# Patient Record
Sex: FEMALE | URBAN - METROPOLITAN AREA
[De-identification: names, ages, dates, MRNs, and addresses within clinical notes are randomized per-mention and may not be internally consistent; named-entity substitution may affect disease eponyms.]

---
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
## 2024-07-22 PROBLEM — S27.322A CONTUSION OF BOTH LUNGS: Status: ACTIVE | Noted: 2024-07-22

## 2024-07-22 PROBLEM — T56.894A: Status: ACTIVE | Noted: 2024-07-22

## 2024-07-22 PROBLEM — F10.10 ALCOHOL ABUSE: Status: ACTIVE | Noted: 2024-07-22

## 2024-07-22 PROBLEM — T42.6X1A: Status: ACTIVE | Noted: 2024-07-22

## 2024-07-22 PROBLEM — E87.20 METABOLIC ACIDOSIS: Status: ACTIVE | Noted: 2024-07-22

## 2024-07-22 PROBLEM — T14.90XA TRAUMA: Status: ACTIVE | Noted: 2024-07-22

## 2024-07-22 PROBLEM — E87.6 HYPOKALEMIA: Status: ACTIVE | Noted: 2024-07-22

## 2024-07-22 PROBLEM — J96.01 ACUTE RESPIRATORY FAILURE WITH HYPOXIA (HCC): Status: ACTIVE | Noted: 2024-07-22

## 2024-07-23 PROBLEM — J18.9 PNEUMONIA OF RIGHT UPPER LOBE DUE TO INFECTIOUS ORGANISM: Status: ACTIVE | Noted: 2024-07-23

## 2024-07-26 ENCOUNTER — HOSPITAL ENCOUNTER (INPATIENT)
Age: 39
LOS: 4 days | Discharge: HOME | DRG: 885 | End: 2024-07-30
Attending: PSYCHIATRY & NEUROLOGY | Admitting: PSYCHIATRY & NEUROLOGY
Payer: COMMERCIAL

## 2024-07-26 PROBLEM — F31.4 BIPOLAR DISORDER, CURRENT EPISODE DEPRESSED, SEVERE (HCC): Status: ACTIVE | Noted: 2024-07-26

## 2024-07-26 PROBLEM — R45.851 DEPRESSION WITH SUICIDAL IDEATION: Status: ACTIVE | Noted: 2024-07-26

## 2024-07-26 PROBLEM — F32.A DEPRESSION WITH SUICIDAL IDEATION: Status: ACTIVE | Noted: 2024-07-26

## 2024-07-26 PROCEDURE — APPSS60 APP SPLIT SHARED TIME 46-60 MINUTES: Performed by: NURSE PRACTITIONER

## 2024-07-26 PROCEDURE — 6370000000 HC RX 637 (ALT 250 FOR IP)

## 2024-07-26 PROCEDURE — 6370000000 HC RX 637 (ALT 250 FOR IP): Performed by: NURSE PRACTITIONER

## 2024-07-26 PROCEDURE — 99223 1ST HOSP IP/OBS HIGH 75: CPT | Performed by: PSYCHIATRY & NEUROLOGY

## 2024-07-26 PROCEDURE — 6370000000 HC RX 637 (ALT 250 FOR IP): Performed by: PSYCHIATRY & NEUROLOGY

## 2024-07-26 PROCEDURE — 1240000000 HC EMOTIONAL WELLNESS R&B

## 2024-07-26 RX ORDER — AMOXICILLIN AND CLAVULANATE POTASSIUM 875; 125 MG/1; MG/1
1 TABLET, FILM COATED ORAL EVERY 12 HOURS SCHEDULED
Status: DISCONTINUED | OUTPATIENT
Start: 2024-07-26 | End: 2024-07-30 | Stop reason: HOSPADM

## 2024-07-26 RX ORDER — POLYETHYLENE GLYCOL 3350 17 G/17G
17 POWDER, FOR SOLUTION ORAL DAILY PRN
Status: DISCONTINUED | OUTPATIENT
Start: 2024-07-26 | End: 2024-07-30 | Stop reason: HOSPADM

## 2024-07-26 RX ORDER — PANTOPRAZOLE SODIUM 40 MG/1
40 TABLET, DELAYED RELEASE ORAL
Status: DISCONTINUED | OUTPATIENT
Start: 2024-07-27 | End: 2024-07-30 | Stop reason: HOSPADM

## 2024-07-26 RX ORDER — ACETAMINOPHEN 325 MG/1
650 TABLET ORAL EVERY 8 HOURS PRN
Status: DISCONTINUED | OUTPATIENT
Start: 2024-07-26 | End: 2024-07-26 | Stop reason: SDUPTHER

## 2024-07-26 RX ORDER — ONDANSETRON 4 MG/1
4 TABLET, ORALLY DISINTEGRATING ORAL EVERY 8 HOURS PRN
Status: DISCONTINUED | OUTPATIENT
Start: 2024-07-26 | End: 2024-07-30 | Stop reason: HOSPADM

## 2024-07-26 RX ORDER — LITHIUM CARBONATE 300 MG
150 TABLET ORAL EVERY 12 HOURS SCHEDULED
Status: DISCONTINUED | OUTPATIENT
Start: 2024-07-26 | End: 2024-07-26

## 2024-07-26 RX ORDER — POLYETHYLENE GLYCOL 3350 17 G
2 POWDER IN PACKET (EA) ORAL
Status: DISCONTINUED | OUTPATIENT
Start: 2024-07-26 | End: 2024-07-26

## 2024-07-26 RX ORDER — NICOTINE 21 MG/24HR
1 PATCH, TRANSDERMAL 24 HOURS TRANSDERMAL DAILY
Status: DISCONTINUED | OUTPATIENT
Start: 2024-07-26 | End: 2024-07-30 | Stop reason: HOSPADM

## 2024-07-26 RX ORDER — LITHIUM CARBONATE 300 MG
300 TABLET ORAL EVERY 12 HOURS SCHEDULED
Status: DISCONTINUED | OUTPATIENT
Start: 2024-07-26 | End: 2024-07-26

## 2024-07-26 RX ORDER — IPRATROPIUM BROMIDE AND ALBUTEROL SULFATE 2.5; .5 MG/3ML; MG/3ML
1 SOLUTION RESPIRATORY (INHALATION) EVERY 4 HOURS PRN
Status: DISCONTINUED | OUTPATIENT
Start: 2024-07-26 | End: 2024-07-30 | Stop reason: HOSPADM

## 2024-07-26 RX ORDER — ONDANSETRON 2 MG/ML
4 INJECTION INTRAMUSCULAR; INTRAVENOUS EVERY 6 HOURS PRN
Status: DISCONTINUED | OUTPATIENT
Start: 2024-07-26 | End: 2024-07-30 | Stop reason: HOSPADM

## 2024-07-26 RX ORDER — HYDROXYZINE 50 MG/1
50 TABLET, FILM COATED ORAL 3 TIMES DAILY PRN
Status: DISCONTINUED | OUTPATIENT
Start: 2024-07-26 | End: 2024-07-30 | Stop reason: HOSPADM

## 2024-07-26 RX ORDER — TRAZODONE HYDROCHLORIDE 50 MG/1
50 TABLET ORAL NIGHTLY PRN
Status: DISCONTINUED | OUTPATIENT
Start: 2024-07-26 | End: 2024-07-30 | Stop reason: HOSPADM

## 2024-07-26 RX ORDER — RISPERIDONE 0.5 MG/1
0.5 TABLET ORAL 2 TIMES DAILY PRN
Status: DISCONTINUED | OUTPATIENT
Start: 2024-07-26 | End: 2024-07-29

## 2024-07-26 RX ORDER — GAUZE BANDAGE 2" X 2"
100 BANDAGE TOPICAL DAILY
Status: DISCONTINUED | OUTPATIENT
Start: 2024-07-27 | End: 2024-07-30 | Stop reason: HOSPADM

## 2024-07-26 RX ORDER — POLYETHYLENE GLYCOL 3350 17 G/17G
17 POWDER, FOR SOLUTION ORAL DAILY
Status: DISCONTINUED | OUTPATIENT
Start: 2024-07-27 | End: 2024-07-30 | Stop reason: HOSPADM

## 2024-07-26 RX ORDER — FOLIC ACID 1 MG/1
1 TABLET ORAL DAILY
Status: DISCONTINUED | OUTPATIENT
Start: 2024-07-27 | End: 2024-07-30 | Stop reason: HOSPADM

## 2024-07-26 RX ORDER — MAGNESIUM HYDROXIDE/ALUMINUM HYDROXICE/SIMETHICONE 120; 1200; 1200 MG/30ML; MG/30ML; MG/30ML
30 SUSPENSION ORAL EVERY 6 HOURS PRN
Status: DISCONTINUED | OUTPATIENT
Start: 2024-07-26 | End: 2024-07-30 | Stop reason: HOSPADM

## 2024-07-26 RX ORDER — LITHIUM CARBONATE 150 MG/1
150 CAPSULE ORAL EVERY 12 HOURS SCHEDULED
Status: DISCONTINUED | OUTPATIENT
Start: 2024-07-26 | End: 2024-07-30 | Stop reason: HOSPADM

## 2024-07-26 RX ORDER — LAMOTRIGINE 25 MG/1
25 TABLET ORAL DAILY
Status: DISCONTINUED | OUTPATIENT
Start: 2024-07-27 | End: 2024-07-30 | Stop reason: HOSPADM

## 2024-07-26 RX ORDER — LANOLIN ALCOHOL/MO/W.PET/CERES
3 CREAM (GRAM) TOPICAL NIGHTLY
Status: DISCONTINUED | OUTPATIENT
Start: 2024-07-26 | End: 2024-07-30 | Stop reason: HOSPADM

## 2024-07-26 RX ORDER — ACETAMINOPHEN 325 MG/1
650 TABLET ORAL EVERY 4 HOURS PRN
Status: DISCONTINUED | OUTPATIENT
Start: 2024-07-26 | End: 2024-07-30 | Stop reason: HOSPADM

## 2024-07-26 RX ADMIN — AMOXICILLIN AND CLAVULANATE POTASSIUM 1 TABLET: 875; 125 TABLET, FILM COATED ORAL at 21:20

## 2024-07-26 RX ADMIN — ACETAMINOPHEN 650 MG: 325 TABLET ORAL at 18:08

## 2024-07-26 RX ADMIN — HYDROXYZINE HYDROCHLORIDE 50 MG: 50 TABLET, FILM COATED ORAL at 21:20

## 2024-07-26 RX ADMIN — Medication 3 MG: at 21:20

## 2024-07-26 RX ADMIN — LITHIUM CARBONATE 150 MG: 150 CAPSULE, GELATIN COATED ORAL at 21:20

## 2024-07-26 RX ADMIN — BENZOCAINE: 0.1 GEL TOPICAL at 21:25

## 2024-07-26 RX ADMIN — TRAZODONE HYDROCHLORIDE 50 MG: 50 TABLET ORAL at 21:20

## 2024-07-26 RX ADMIN — BENZOCAINE: 0.1 GEL TOPICAL at 18:08

## 2024-07-26 ASSESSMENT — PAIN DESCRIPTION - LOCATION
LOCATION: MOUTH
LOCATION: MOUTH

## 2024-07-26 ASSESSMENT — PATIENT HEALTH QUESTIONNAIRE - PHQ9
1. LITTLE INTEREST OR PLEASURE IN DOING THINGS: NOT AT ALL
SUM OF ALL RESPONSES TO PHQ9 QUESTIONS 1 & 2: 0
SUM OF ALL RESPONSES TO PHQ QUESTIONS 1-9: 0
SUM OF ALL RESPONSES TO PHQ QUESTIONS 1-9: 0
2. FEELING DOWN, DEPRESSED OR HOPELESS: NOT AT ALL
SUM OF ALL RESPONSES TO PHQ QUESTIONS 1-9: 0
SUM OF ALL RESPONSES TO PHQ QUESTIONS 1-9: 0

## 2024-07-26 ASSESSMENT — SLEEP AND FATIGUE QUESTIONNAIRES
DO YOU HAVE DIFFICULTY SLEEPING: YES
AVERAGE NUMBER OF SLEEP HOURS: 3
DO YOU USE A SLEEP AID: NO
SLEEP PATTERN: DISTURBED/INTERRUPTED SLEEP;RESTLESSNESS

## 2024-07-26 ASSESSMENT — PAIN - FUNCTIONAL ASSESSMENT: PAIN_FUNCTIONAL_ASSESSMENT: ACTIVITIES ARE NOT PREVENTED

## 2024-07-26 ASSESSMENT — LIFESTYLE VARIABLES
HOW MANY STANDARD DRINKS CONTAINING ALCOHOL DO YOU HAVE ON A TYPICAL DAY: 5 OR 6
HOW MANY STANDARD DRINKS CONTAINING ALCOHOL DO YOU HAVE ON A TYPICAL DAY: 5 OR 6
HOW OFTEN DO YOU HAVE A DRINK CONTAINING ALCOHOL: 2-3 TIMES A WEEK
HOW OFTEN DO YOU HAVE A DRINK CONTAINING ALCOHOL: 2-3 TIMES A WEEK

## 2024-07-26 ASSESSMENT — PAIN SCALES - GENERAL
PAINLEVEL_OUTOF10: 4
PAINLEVEL_OUTOF10: 5

## 2024-07-26 NOTE — BH NOTE
Discharge order appeared from non psych provider. Clarified with NP, there is no plan for patient to be discharged today. Order discontinued.

## 2024-07-26 NOTE — BH NOTE
On call provider notified of best practice advisory placing patient on suicide precautions. Order will be discontinued as patient does not meet criteria for suicide precautions at this time.  Patient will be continued on Q 15 minute checks.

## 2024-07-26 NOTE — BH NOTE
Patient arrived on unit transported by two staff members via SQUAD and stretcher with signed voluntary in hand. Patient ambulated independently was assessed, vitals obtained, wanded, changed into appropriate clothing and orientated to unit.
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
## 2024-07-27 LAB
ALBUMIN SERPL-MCNC: 3.2 G/DL (ref 3.5–5.2)
ALP SERPL-CCNC: 80 U/L (ref 35–104)
ALT SERPL-CCNC: 7 U/L (ref 5–33)
ANION GAP SERPL CALCULATED.3IONS-SCNC: 11 MMOL/L (ref 9–17)
AST SERPL-CCNC: 11 U/L
BILIRUB SERPL-MCNC: 0.3 MG/DL (ref 0.3–1.2)
BUN SERPL-MCNC: 8 MG/DL (ref 6–20)
CALCIUM SERPL-MCNC: 8.6 MG/DL (ref 8.6–10.4)
CHLORIDE SERPL-SCNC: 105 MMOL/L (ref 98–107)
CHOLEST SERPL-MCNC: 150 MG/DL
CHOLESTEROL/HDL RATIO: 3.9
CO2 SERPL-SCNC: 26 MMOL/L (ref 20–31)
CREAT SERPL-MCNC: 0.6 MG/DL (ref 0.5–0.9)
EST. AVERAGE GLUCOSE BLD GHB EST-MCNC: 100 MG/DL
GFR, ESTIMATED: >90 ML/MIN/1.73M2
GLUCOSE SERPL-MCNC: 87 MG/DL (ref 70–99)
HBA1C MFR BLD: 5.1 % (ref 4–6)
HDLC SERPL-MCNC: 38 MG/DL
LDLC SERPL CALC-MCNC: 90 MG/DL (ref 0–130)
LITHIUM DATE LAST DOSE: ABNORMAL
LITHIUM DOSE AMOUNT: 150
LITHIUM DOSE TIME: 2120
LITHIUM LEVEL: 0.2 MMOL/L (ref 0.6–1.2)
MAGNESIUM SERPL-MCNC: 2 MG/DL (ref 1.6–2.6)
POTASSIUM SERPL-SCNC: 3.3 MMOL/L (ref 3.7–5.3)
PROT SERPL-MCNC: 6.2 G/DL (ref 6.4–8.3)
SODIUM SERPL-SCNC: 142 MMOL/L (ref 135–144)
TRIGL SERPL-MCNC: 108 MG/DL

## 2024-07-27 PROCEDURE — 1240000000 HC EMOTIONAL WELLNESS R&B

## 2024-07-27 PROCEDURE — 83735 ASSAY OF MAGNESIUM: CPT

## 2024-07-27 PROCEDURE — 99222 1ST HOSP IP/OBS MODERATE 55: CPT | Performed by: INTERNAL MEDICINE

## 2024-07-27 PROCEDURE — 6370000000 HC RX 637 (ALT 250 FOR IP)

## 2024-07-27 PROCEDURE — 80061 LIPID PANEL: CPT

## 2024-07-27 PROCEDURE — 36415 COLL VENOUS BLD VENIPUNCTURE: CPT

## 2024-07-27 PROCEDURE — 6370000000 HC RX 637 (ALT 250 FOR IP): Performed by: NURSE PRACTITIONER

## 2024-07-27 PROCEDURE — 80053 COMPREHEN METABOLIC PANEL: CPT

## 2024-07-27 PROCEDURE — 99232 SBSQ HOSP IP/OBS MODERATE 35: CPT | Performed by: NURSE PRACTITIONER

## 2024-07-27 PROCEDURE — 6370000000 HC RX 637 (ALT 250 FOR IP): Performed by: PSYCHIATRY & NEUROLOGY

## 2024-07-27 PROCEDURE — 83036 HEMOGLOBIN GLYCOSYLATED A1C: CPT

## 2024-07-27 PROCEDURE — 6370000000 HC RX 637 (ALT 250 FOR IP): Performed by: INTERNAL MEDICINE

## 2024-07-27 PROCEDURE — 80178 ASSAY OF LITHIUM: CPT

## 2024-07-27 RX ORDER — LISINOPRIL 5 MG/1
2.5 TABLET ORAL DAILY
Status: DISCONTINUED | OUTPATIENT
Start: 2024-07-27 | End: 2024-07-29

## 2024-07-27 RX ORDER — POTASSIUM CHLORIDE 20 MEQ/1
10 TABLET, EXTENDED RELEASE ORAL 2 TIMES DAILY WITH MEALS
Status: COMPLETED | OUTPATIENT
Start: 2024-07-27 | End: 2024-07-29

## 2024-07-27 RX ADMIN — HYDROXYZINE HYDROCHLORIDE 50 MG: 50 TABLET, FILM COATED ORAL at 08:50

## 2024-07-27 RX ADMIN — Medication 3 MG: at 22:27

## 2024-07-27 RX ADMIN — LISINOPRIL 2.5 MG: 5 TABLET ORAL at 18:15

## 2024-07-27 RX ADMIN — ACETAMINOPHEN 650 MG: 325 TABLET ORAL at 14:28

## 2024-07-27 RX ADMIN — AMOXICILLIN AND CLAVULANATE POTASSIUM 1 TABLET: 875; 125 TABLET, FILM COATED ORAL at 22:27

## 2024-07-27 RX ADMIN — AMOXICILLIN AND CLAVULANATE POTASSIUM 1 TABLET: 875; 125 TABLET, FILM COATED ORAL at 08:50

## 2024-07-27 RX ADMIN — THIAMINE HCL TAB 100 MG 100 MG: 100 TAB at 08:50

## 2024-07-27 RX ADMIN — ACETAMINOPHEN 650 MG: 325 TABLET ORAL at 07:26

## 2024-07-27 RX ADMIN — POTASSIUM CHLORIDE 10 MEQ: 1500 TABLET, EXTENDED RELEASE ORAL at 18:15

## 2024-07-27 RX ADMIN — HYDROXYZINE HYDROCHLORIDE 50 MG: 50 TABLET, FILM COATED ORAL at 22:27

## 2024-07-27 RX ADMIN — LITHIUM CARBONATE 150 MG: 150 CAPSULE, GELATIN COATED ORAL at 22:27

## 2024-07-27 RX ADMIN — TRAZODONE HYDROCHLORIDE 50 MG: 50 TABLET ORAL at 22:27

## 2024-07-27 RX ADMIN — PANTOPRAZOLE SODIUM 40 MG: 40 TABLET, DELAYED RELEASE ORAL at 06:27

## 2024-07-27 RX ADMIN — LAMOTRIGINE 25 MG: 25 TABLET ORAL at 08:50

## 2024-07-27 RX ADMIN — BENZOCAINE: 0.1 GEL TOPICAL at 22:27

## 2024-07-27 RX ADMIN — HYDROXYZINE HYDROCHLORIDE 50 MG: 50 TABLET, FILM COATED ORAL at 16:24

## 2024-07-27 RX ADMIN — LITHIUM CARBONATE 150 MG: 150 CAPSULE, GELATIN COATED ORAL at 08:50

## 2024-07-27 RX ADMIN — FOLIC ACID 1 MG: 1 TABLET ORAL at 08:50

## 2024-07-27 ASSESSMENT — PAIN SCALES - GENERAL
PAINLEVEL_OUTOF10: 5
PAINLEVEL_OUTOF10: 6
PAINLEVEL_OUTOF10: 6

## 2024-07-27 ASSESSMENT — LIFESTYLE VARIABLES
HOW OFTEN DO YOU HAVE A DRINK CONTAINING ALCOHOL: 2-3 TIMES A WEEK
HOW MANY STANDARD DRINKS CONTAINING ALCOHOL DO YOU HAVE ON A TYPICAL DAY: 5 OR 6
HOW OFTEN DO YOU HAVE A DRINK CONTAINING ALCOHOL: 2-3 TIMES A WEEK
HOW MANY STANDARD DRINKS CONTAINING ALCOHOL DO YOU HAVE ON A TYPICAL DAY: 5 OR 6

## 2024-07-27 ASSESSMENT — PAIN DESCRIPTION - LOCATION
LOCATION: MOUTH
LOCATION: GENERALIZED
LOCATION: GENERALIZED

## 2024-07-27 ASSESSMENT — PAIN DESCRIPTION - DESCRIPTORS: DESCRIPTORS: DISCOMFORT

## 2024-07-27 NOTE — GROUP NOTE
Psych-Ed/Relapse Prevention Group Note        Date: July 27, 2024 Start Time: 2:30pm  End Time:  3:15pm      Number of Participants in Group & Unit Census:  6/16    Topic: Socialization skills    Goal of Group:Patient will demonstrate improved interpersonal skills and offer peer support      Comments:     Patient did not participate in Psych-Ed/Relapse Prevention group, despite staff encouragement and explanation of benefits.  Patient remain seclusive to self.  Q15 minute safety checks maintained for patient safety and will continue to encourage patient to attend unit programming.         Signature:  FRANKIE PerezS

## 2024-07-27 NOTE — GROUP NOTE
Group Therapy Note    Date: 7/27/2024    Group Start Time: 1030  Group End Time: 1125  Group Topic: Psychoeducation    [facility] Elena Pritchard LSW        Group Therapy Note    Attendees: 6/15       Patient's Goal:  managing symptoms of anxiety     Notes:      Status After Intervention:  Improved    Participation Level: Active Listener and Interactive    Participation Quality: Appropriate      Speech:  normal      Thought Process/Content: Logical      Affective Functioning: Flat      Mood: anxious      Level of consciousness:  Alert and Oriented x4      Response to Learning: Progressing to goal      Endings: None Reported    Modes of Intervention: Education and Support      Discipline Responsible: /Counselor      Signature:  AUDRA Quinonez

## 2024-07-27 NOTE — H&P
[facility] Internal Medicine  Jatin See MD; Abdoul Melendez MD, Chucho Post MD, April Perez MD, Brayan Mackenzie MD; Bing Cox MD    [facility] Internal Medicine   IN-PATIENT SERVICE   [facility]     HISTORY AND PHYSICAL EXAMINATION            Date:   7/27/2024  Patient name:  Audrey Bryant  Date of admission:  7/26/2024  1:01 PM  MRN:   [id number]  Account:  [id number]  YOB: 1985  PCP:    Kirill Lopes DO  Room:   [address] [place] [address]  Code Status:    Full Code      Chief Complaint:     Suicidal /Ac Psychosis    History Obtained From:     Patient/EMR/bedside RN     History of Present Illness:   38 female with underlying history of anxiety, depression, polysubstance abuse, obesity, hypertension, admitted to inpatient psych for suicidal ideation      Past Medical History:     No past medical history on file.     Past Surgical History:     No past surgical history on file.     Medications Prior to Admission:     Prior to Admission medications    Medication Sig Start Date End Date Taking? Authorizing Provider   lamoTRIgine (LAMICTAL) 25 MG tablet Take 1 tablet by mouth daily 7/26/24   Davina Cope MD   amoxicillin-clavulanate (AUGMENTIN) 875-125 MG per tablet Take 1 tablet by mouth every 12 hours for 10 doses 7/25/24 7/30/24  Davina Cope MD   lithium citrate 8 MEQ/5ML SOLN oral solution Take 1.7 mLs by mouth in the morning and 1.7 mLs in the evening. 7/25/24   MoDavina nugent MD        Allergies:     Patient has no known allergies.    Social History:     Tobacco:    reports that she has been smoking cigarettes. She does not have any smokeless tobacco history on file.  Alcohol:      has no history on file for alcohol use.  Drug Use:  has no history on file for drug use.    Family History:     No family history on file.    Review of Systems:     Positive and Negative as described in HPI.    CONSTITUTIONAL:  negative for fevers, 
violence or aggression: [] Yes [x] No         PSYCHIATRIC HISTORY:  [x] Yes [] No    Currently follows with [facility].  Two lifetime suicide attempts  Reports four previous psychiatric hospital admissions at [facility].    Home Medication Compliance: [] Yes [x] No    Past psychiatric medications includes: Lamictal, lithium, Wellbutrin, propranolol, trazodone    Adverse reactions from psychotropic medications: [] Yes [x] No         Lifetime Psychiatric Review of Systems         Depression: Endorses     Anxiety: Endorses     Panic Attacks: Endorses     Cynthia or Hypomania: Endorses     Phobias: Denies     Obsessions and Compulsions: Denies     Body or Vocal Tics: Denies     Visual Hallucinations: Denies     Auditory Hallucinations: Denies     Delusions/Paranoia: Denies     PTSD: Denies    Past Medical History:    No past medical history on file.    Past Surgical History:    No past surgical history on file.    Allergies:  Patient has no known allergies.         Social History:     Born in: [place], [place]  Family: Raised by mother who is now , reports father in and out of life.  Has 3 siblings.  Highest Level of Education: graduated HS.  Occupation: manager at [employer]  Marital Status:   Children: 2, ages 11 and 13  Residence: Apartment with children  Stressors: mental illness, finances  Patient Assets/Supportive Factors: Patient seeks assistance.         DRUG USE HISTORY  Social History     Tobacco Use   Smoking Status Every Day    Current packs/day: 1.00    Types: Cigarettes   Smokeless Tobacco Not on file     Social History     Substance and Sexual Activity   Alcohol Use Not on file     Social History     Substance and Sexual Activity   Drug Use Not on file       Admits to smoking marijuana, she does not provide details of frequency.  Admits to drinking a 10 pack of \"fireball\" 3 to 4 days a week.         LEGAL HISTORY:   HISTORY OF INCARCERATION: [] Yes [x] No    Family History:   No family history on

## 2024-07-27 NOTE — GROUP NOTE
Group Therapy Note    Date: 7/27/2024    Group Start Time: 0900  Group End Time: 0920  Group Topic: Community Meeting    Lashell Wharton        Group Therapy Note    Attendees: 6       Patient's Goal:  to get composed    Status After Intervention:  Improved    Participation Level: Active Listener and Interactive    Participation Quality: Appropriate      Speech:  normal      Thought Process/Content: Logical      Affective Functioning: Congruent      Mood: euthymic      Level of consciousness:  Alert, Oriented x4, and Attentive      Response to Learning: Able to verbalize current knowledge/experience and Progressing to goal      Endings: None Reported    Modes of Intervention: Education      Discipline Responsible: Behavorial Health Tech      Signature:  Lashell Sprague

## 2024-07-28 PROCEDURE — 6370000000 HC RX 637 (ALT 250 FOR IP): Performed by: NURSE PRACTITIONER

## 2024-07-28 PROCEDURE — 6370000000 HC RX 637 (ALT 250 FOR IP)

## 2024-07-28 PROCEDURE — 6370000000 HC RX 637 (ALT 250 FOR IP): Performed by: PSYCHIATRY & NEUROLOGY

## 2024-07-28 PROCEDURE — 6370000000 HC RX 637 (ALT 250 FOR IP): Performed by: INTERNAL MEDICINE

## 2024-07-28 PROCEDURE — 99232 SBSQ HOSP IP/OBS MODERATE 35: CPT | Performed by: NURSE PRACTITIONER

## 2024-07-28 PROCEDURE — 1240000000 HC EMOTIONAL WELLNESS R&B

## 2024-07-28 RX ADMIN — LITHIUM CARBONATE 150 MG: 150 CAPSULE, GELATIN COATED ORAL at 08:35

## 2024-07-28 RX ADMIN — HYDROXYZINE HYDROCHLORIDE 50 MG: 50 TABLET, FILM COATED ORAL at 08:57

## 2024-07-28 RX ADMIN — POTASSIUM CHLORIDE 10 MEQ: 1500 TABLET, EXTENDED RELEASE ORAL at 18:04

## 2024-07-28 RX ADMIN — POTASSIUM CHLORIDE 10 MEQ: 1500 TABLET, EXTENDED RELEASE ORAL at 08:35

## 2024-07-28 RX ADMIN — THIAMINE HCL TAB 100 MG 100 MG: 100 TAB at 08:35

## 2024-07-28 RX ADMIN — HYDROXYZINE HYDROCHLORIDE 50 MG: 50 TABLET, FILM COATED ORAL at 22:14

## 2024-07-28 RX ADMIN — PANTOPRAZOLE SODIUM 40 MG: 40 TABLET, DELAYED RELEASE ORAL at 06:24

## 2024-07-28 RX ADMIN — Medication 3 MG: at 22:15

## 2024-07-28 RX ADMIN — LAMOTRIGINE 25 MG: 25 TABLET ORAL at 08:35

## 2024-07-28 RX ADMIN — AMOXICILLIN AND CLAVULANATE POTASSIUM 1 TABLET: 875; 125 TABLET, FILM COATED ORAL at 22:14

## 2024-07-28 RX ADMIN — AMOXICILLIN AND CLAVULANATE POTASSIUM 1 TABLET: 875; 125 TABLET, FILM COATED ORAL at 08:35

## 2024-07-28 RX ADMIN — FOLIC ACID 1 MG: 1 TABLET ORAL at 08:35

## 2024-07-28 RX ADMIN — ACETAMINOPHEN 650 MG: 325 TABLET ORAL at 07:56

## 2024-07-28 RX ADMIN — TRAZODONE HYDROCHLORIDE 50 MG: 50 TABLET ORAL at 22:15

## 2024-07-28 RX ADMIN — LITHIUM CARBONATE 150 MG: 150 CAPSULE, GELATIN COATED ORAL at 22:15

## 2024-07-28 RX ADMIN — LISINOPRIL 2.5 MG: 5 TABLET ORAL at 08:35

## 2024-07-28 RX ADMIN — HYDROXYZINE HYDROCHLORIDE 50 MG: 50 TABLET, FILM COATED ORAL at 15:21

## 2024-07-28 ASSESSMENT — PAIN SCALES - GENERAL: PAINLEVEL_OUTOF10: 5

## 2024-07-28 ASSESSMENT — PAIN DESCRIPTION - LOCATION: LOCATION: GENERALIZED

## 2024-07-28 NOTE — BH NOTE
The patient approached the writer to ask whether she was currently taking an antidepressant. The writer responded that she was not. The patient conveyed her desire to start one, as she feels she needs it, and expressed her reluctance to leave until she feels assured about her medication regimen and her daily functioning post-discharge. She also inquire about the possibility of long-acting medications. Writer informed her that she would need to discuss this with the NP or psychiatrist the following day.

## 2024-07-28 NOTE — GROUP NOTE
Group Therapy Note    Date: 7/28/2024    Group Start Time: 0900  Group End Time: 0920  Group Topic: Community Meeting    Lashell Wharton        Group Therapy Note    Attendees: 5       Patient's Goal:  stay out more in the dayroom    Status After Intervention:  Improved    Participation Level: Active Listener and Interactive    Participation Quality: Appropriate and Attentive      Speech:  normal      Thought Process/Content: Logical      Affective Functioning: Congruent      Mood: euthymic      Level of consciousness:  Alert, Oriented x4, and Attentive      Response to Learning: Able to verbalize current knowledge/experience and Progressing to goal      Endings: None Reported    Modes of Intervention: Education      Discipline Responsible: Behavorial Health Tech      Signature:  Lashell Sprague

## 2024-07-28 NOTE — GROUP NOTE
Group Therapy Note    Date: 7/28/2024    Group Start Time: 1030  Group End Time: 1135  Group Topic: Psychoeducation    [facility] Elena Pritchard LSW        Group Therapy Note    Attendees: 8/19       Patient's Goal:  coping mantras    Notes:      Status After Intervention:  Improved    Participation Level: Active Listener and Interactive    Participation Quality: Appropriate      Speech:  normal      Thought Process/Content: Logical      Affective Functioning: Congruent      Mood: euthymic      Level of consciousness:  Alert and Oriented x4      Response to Learning: Able to verbalize current knowledge/experience and Able to verbalize/acknowledge new learning      Endings: None Reported    Modes of Intervention: Education and Support      Discipline Responsible: /Counselor      Signature:  AUDRA Quinonez

## 2024-07-28 NOTE — GROUP NOTE
Group Therapy Note    Date: 7/28/2024    Group Start Time: 1400  Group End Time: 1445  Group Topic: Psychoeducation    [facility]    Renata Pearson CTRS    Group Therapy Note    Attendees: 7/19     Patient's Goal:  Patient will identify benefits of music for coping and stress management    Notes:  Patient attended group and participated    Status After Intervention:  Improved    Participation Level: Active Listener and Interactive    Participation Quality: Appropriate, Attentive, Sharing, and Supportive      Speech:  normal      Thought Process/Content: Logical  Linear      Affective Functioning: Congruent      Mood: euthymic      Level of consciousness:  Alert, Oriented x4, and Attentive      Response to Learning: Able to verbalize current knowledge/experience, Able to verbalize/acknowledge new learning, Able to retain information, Capable of insight, Able to change behavior, and Progressing to goal      Endings: None Reported    Modes of Intervention: Education, Support, Socialization, and Exploration      Discipline Responsible: Psychoeducational Specialist      Signature:  MAGALIE Perez     none

## 2024-07-29 PROCEDURE — 1240000000 HC EMOTIONAL WELLNESS R&B

## 2024-07-29 PROCEDURE — 6370000000 HC RX 637 (ALT 250 FOR IP): Performed by: PSYCHIATRY & NEUROLOGY

## 2024-07-29 PROCEDURE — 6370000000 HC RX 637 (ALT 250 FOR IP): Performed by: NURSE PRACTITIONER

## 2024-07-29 PROCEDURE — 6370000000 HC RX 637 (ALT 250 FOR IP)

## 2024-07-29 PROCEDURE — 99232 SBSQ HOSP IP/OBS MODERATE 35: CPT | Performed by: PSYCHIATRY & NEUROLOGY

## 2024-07-29 PROCEDURE — 6370000000 HC RX 637 (ALT 250 FOR IP): Performed by: INTERNAL MEDICINE

## 2024-07-29 PROCEDURE — 99232 SBSQ HOSP IP/OBS MODERATE 35: CPT | Performed by: INTERNAL MEDICINE

## 2024-07-29 RX ORDER — LISINOPRIL 10 MG/1
10 TABLET ORAL DAILY
Status: DISCONTINUED | OUTPATIENT
Start: 2024-07-30 | End: 2024-07-30 | Stop reason: HOSPADM

## 2024-07-29 RX ORDER — RISPERIDONE 1 MG/1
1 TABLET ORAL 2 TIMES DAILY
Status: DISCONTINUED | OUTPATIENT
Start: 2024-07-29 | End: 2024-07-30 | Stop reason: HOSPADM

## 2024-07-29 RX ADMIN — LAMOTRIGINE 25 MG: 25 TABLET ORAL at 08:56

## 2024-07-29 RX ADMIN — Medication 3 MG: at 22:38

## 2024-07-29 RX ADMIN — RISPERIDONE 1 MG: 1 TABLET, FILM COATED ORAL at 13:57

## 2024-07-29 RX ADMIN — RISPERIDONE 1 MG: 1 TABLET, FILM COATED ORAL at 22:38

## 2024-07-29 RX ADMIN — ACETAMINOPHEN 650 MG: 325 TABLET ORAL at 08:59

## 2024-07-29 RX ADMIN — AMOXICILLIN AND CLAVULANATE POTASSIUM 1 TABLET: 875; 125 TABLET, FILM COATED ORAL at 08:58

## 2024-07-29 RX ADMIN — FOLIC ACID 1 MG: 1 TABLET ORAL at 08:56

## 2024-07-29 RX ADMIN — POTASSIUM CHLORIDE 10 MEQ: 1500 TABLET, EXTENDED RELEASE ORAL at 08:56

## 2024-07-29 RX ADMIN — THIAMINE HCL TAB 100 MG 100 MG: 100 TAB at 08:58

## 2024-07-29 RX ADMIN — LISINOPRIL 2.5 MG: 5 TABLET ORAL at 08:57

## 2024-07-29 RX ADMIN — AMOXICILLIN AND CLAVULANATE POTASSIUM 1 TABLET: 875; 125 TABLET, FILM COATED ORAL at 22:38

## 2024-07-29 RX ADMIN — ACETAMINOPHEN 650 MG: 325 TABLET ORAL at 21:47

## 2024-07-29 RX ADMIN — LITHIUM CARBONATE 150 MG: 150 CAPSULE, GELATIN COATED ORAL at 08:59

## 2024-07-29 RX ADMIN — PANTOPRAZOLE SODIUM 40 MG: 40 TABLET, DELAYED RELEASE ORAL at 06:53

## 2024-07-29 RX ADMIN — TRAZODONE HYDROCHLORIDE 50 MG: 50 TABLET ORAL at 22:52

## 2024-07-29 RX ADMIN — LITHIUM CARBONATE 150 MG: 150 CAPSULE, GELATIN COATED ORAL at 22:38

## 2024-07-29 RX ADMIN — HYDROXYZINE HYDROCHLORIDE 50 MG: 50 TABLET, FILM COATED ORAL at 08:59

## 2024-07-29 ASSESSMENT — PAIN DESCRIPTION - LOCATION: LOCATION: HEAD

## 2024-07-29 ASSESSMENT — PAIN SCALES - GENERAL
PAINLEVEL_OUTOF10: 0
PAINLEVEL_OUTOF10: 5
PAINLEVEL_OUTOF10: 4

## 2024-07-29 ASSESSMENT — PAIN SCALES - WONG BAKER: WONGBAKER_NUMERICALRESPONSE: NO HURT

## 2024-07-29 NOTE — GROUP NOTE
Group Therapy Note    Date: 7/29/2024    Group Start Time: 1400  Group End Time: 1445  Group Topic: Psychoeducation    [facility]    Renata Pearson CTRS    Group Therapy Note    Attendees: 6/18    Patient's Goal:  Patient will demonstrate improved interpersonal skills and offer peer support    Notes:  Patient attended group and participated    Status After Intervention:  Improved    Participation Level: Active Listener and Interactive    Participation Quality: Appropriate, Attentive, Sharing, and Supportive      Speech:  normal      Thought Process/Content: Logical  Linear      Affective Functioning: Congruent      Mood: euthymic      Level of consciousness:  Alert, Oriented x4, and Attentive      Response to Learning: Able to verbalize current knowledge/experience, Able to verbalize/acknowledge new learning, Able to retain information, Capable of insight, Able to change behavior, and Progressing to goal      Endings: None Reported    Modes of Intervention: Education, Support, Socialization, and Exploration      Discipline Responsible: Psychoeducational Specialist      Signature:  MAGALIE Perez

## 2024-07-29 NOTE — GROUP NOTE
Group Therapy Note    Date: 7/29/2024    Group Start Time: 1000  Group End Time: 1025  Group Topic: Psychotherapy    [facility]    Gloria Patel MSW, AUDRA        Group Therapy Note    Attendees: 5/20       Patient's Goal:  Increase interpersonal relationship skills utilizing talk therapy while discussing positive coping skills for Depression.    Notes:  Patient left group early    Status After Intervention:  Unchanged    Participation Level: Minimal    Participation Quality: Resistant      Speech:  normal      Thought Process/Content: Logical      Affective Functioning: Flat      Mood: irritable      Level of consciousness:  Preoccupied      Response to Learning: Able to verbalize current knowledge/experience      Endings: None Reported    Modes of Intervention: Education, Support, and Socialization      Discipline Responsible: /Counselor      Signature:  AMRIT Olivera, AUDRA

## 2024-07-30 VITALS
RESPIRATION RATE: 14 BRPM | DIASTOLIC BLOOD PRESSURE: 88 MMHG | HEIGHT: 66 IN | WEIGHT: 210 LBS | HEART RATE: 75 BPM | TEMPERATURE: 98 F | SYSTOLIC BLOOD PRESSURE: 150 MMHG | BODY MASS INDEX: 33.75 KG/M2

## 2024-07-30 LAB
BASOPHILS # BLD: 0.1 K/UL (ref 0–0.2)
BASOPHILS NFR BLD: 1 % (ref 0–2)
EOSINOPHIL # BLD: 0.6 K/UL (ref 0–0.4)
EOSINOPHILS RELATIVE PERCENT: 7 % (ref 0–4)
ERYTHROCYTE [DISTWIDTH] IN BLOOD BY AUTOMATED COUNT: 13 % (ref 11.5–14.9)
HCT VFR BLD AUTO: 39.1 % (ref 36–46)
HGB BLD-MCNC: 12.6 G/DL (ref 12–16)
LITHIUM DATE LAST DOSE: ABNORMAL
LITHIUM DOSE AMOUNT: ABNORMAL
LITHIUM DOSE TIME: 2238
LITHIUM LEVEL: 0.1 MMOL/L (ref 0.6–1.2)
LYMPHOCYTES NFR BLD: 3.5 K/UL (ref 1–4.8)
LYMPHOCYTES RELATIVE PERCENT: 37 % (ref 24–44)
MCH RBC QN AUTO: 31.3 PG (ref 26–34)
MCHC RBC AUTO-ENTMCNC: 32.3 G/DL (ref 31–37)
MCV RBC AUTO: 97 FL (ref 80–100)
MONOCYTES NFR BLD: 0.6 K/UL (ref 0.1–1.3)
MONOCYTES NFR BLD: 6 % (ref 1–7)
NEUTROPHILS NFR BLD: 49 % (ref 36–66)
NEUTS SEG NFR BLD: 4.5 K/UL (ref 1.3–9.1)
PLATELET # BLD AUTO: 475 K/UL (ref 150–450)
PMV BLD AUTO: 7.4 FL (ref 6–12)
RBC # BLD AUTO: 4.03 M/UL (ref 4–5.2)
WBC OTHER # BLD: 9.3 K/UL (ref 3.5–11)

## 2024-07-30 PROCEDURE — 85025 COMPLETE CBC W/AUTO DIFF WBC: CPT

## 2024-07-30 PROCEDURE — 80178 ASSAY OF LITHIUM: CPT

## 2024-07-30 PROCEDURE — 6370000000 HC RX 637 (ALT 250 FOR IP): Performed by: INTERNAL MEDICINE

## 2024-07-30 PROCEDURE — 6370000000 HC RX 637 (ALT 250 FOR IP)

## 2024-07-30 PROCEDURE — 99239 HOSP IP/OBS DSCHRG MGMT >30: CPT | Performed by: PSYCHIATRY & NEUROLOGY

## 2024-07-30 PROCEDURE — 36415 COLL VENOUS BLD VENIPUNCTURE: CPT

## 2024-07-30 PROCEDURE — 6370000000 HC RX 637 (ALT 250 FOR IP): Performed by: PSYCHIATRY & NEUROLOGY

## 2024-07-30 PROCEDURE — 6370000000 HC RX 637 (ALT 250 FOR IP): Performed by: NURSE PRACTITIONER

## 2024-07-30 RX ORDER — FOLIC ACID 1 MG/1
1 TABLET ORAL DAILY
Qty: 30 TABLET | Refills: 3 | Status: SHIPPED | OUTPATIENT
Start: 2024-07-31

## 2024-07-30 RX ORDER — LISINOPRIL 10 MG/1
10 TABLET ORAL DAILY
Qty: 30 TABLET | Refills: 3 | Status: SHIPPED | OUTPATIENT
Start: 2024-07-31

## 2024-07-30 RX ORDER — LITHIUM CARBONATE 150 MG/1
150 CAPSULE ORAL EVERY 12 HOURS SCHEDULED
Qty: 90 CAPSULE | Refills: 3 | Status: SHIPPED | OUTPATIENT
Start: 2024-07-30

## 2024-07-30 RX ORDER — RISPERIDONE 1 MG/1
1 TABLET ORAL 2 TIMES DAILY
Qty: 60 TABLET | Refills: 3 | Status: SHIPPED | OUTPATIENT
Start: 2024-07-30

## 2024-07-30 RX ORDER — TRAZODONE HYDROCHLORIDE 50 MG/1
50 TABLET ORAL NIGHTLY PRN
Qty: 30 TABLET | Refills: 0 | Status: SHIPPED | OUTPATIENT
Start: 2024-07-30

## 2024-07-30 RX ORDER — LAMOTRIGINE 25 MG/1
25 TABLET ORAL DAILY
Qty: 30 TABLET | Refills: 3 | Status: SHIPPED | OUTPATIENT
Start: 2024-07-30

## 2024-07-30 RX ORDER — THIAMINE MONONITRATE (VIT B1) 100 MG
100 TABLET ORAL DAILY
Qty: 30 TABLET | Refills: 0 | Status: SHIPPED | OUTPATIENT
Start: 2024-07-31

## 2024-07-30 RX ADMIN — HYDROXYZINE HYDROCHLORIDE 50 MG: 50 TABLET, FILM COATED ORAL at 09:32

## 2024-07-30 RX ADMIN — FOLIC ACID 1 MG: 1 TABLET ORAL at 09:37

## 2024-07-30 RX ADMIN — LITHIUM CARBONATE 150 MG: 150 CAPSULE, GELATIN COATED ORAL at 09:37

## 2024-07-30 RX ADMIN — LISINOPRIL 10 MG: 10 TABLET ORAL at 09:38

## 2024-07-30 RX ADMIN — RISPERIDONE 1 MG: 1 TABLET, FILM COATED ORAL at 09:38

## 2024-07-30 RX ADMIN — PANTOPRAZOLE SODIUM 40 MG: 40 TABLET, DELAYED RELEASE ORAL at 07:03

## 2024-07-30 RX ADMIN — AMOXICILLIN AND CLAVULANATE POTASSIUM 1 TABLET: 875; 125 TABLET, FILM COATED ORAL at 09:37

## 2024-07-30 RX ADMIN — LAMOTRIGINE 25 MG: 25 TABLET ORAL at 09:38

## 2024-07-30 RX ADMIN — THIAMINE HCL TAB 100 MG 100 MG: 100 TAB at 09:37

## 2024-07-30 ASSESSMENT — PAIN SCALES - GENERAL: PAINLEVEL_OUTOF10: 0

## 2024-07-30 NOTE — DISCHARGE INSTRUCTIONS
Information:  Medications:   Medication summary provided   I understand that I should take only the medications on my list.     -why and when I need to take each medicine.     -which side effects to watch for.     -that I should carry my medication information at all times in case of     Emergency situations.    I will take all of my medicines to follow up appointments.     -check with my physician or pharmacist before taking any new    Medication, over the counter product or drink alcohol.    -Ask about food, drug or dietary supplement interactions.    -discard old lists and update records with medication providers.    Notify Physician:  Notify physician if you notice:   Always call 911 if you feel your life is in danger  In case of an emergency call 911 immediately!  If 911 is not available call your local emergency medical system for help    Behavioral Health Follow Up:  Original Referral Source:[facility]  Discharge Diagnosis: Depression with suicidal ideation [F32.A, R45.851]  Recommendations for Level of Care: Follow up with aftercare appointments  Patient status at discharge: stable  My hospital  was: [facility]  Aftercare plan faxed: yes   -faxed by: RN   -date: 7/30/2024   -time: 1400  Prescriptions: verified    Smoking: Quit Smoking.   Call the [employer]'s smoking quitline at 6-223-70C-QUIT  Know the signs of a heart attack   If you have any of the following symptoms call 911 immediately, do not wait more    Than five minutes.    1. Pressure, fullness and/ or squeezing in the center of the chest spreading to    The jaw, neck or shoulder.    2. Chest discomfort with light headedness, fainting, sweating, nausea or    Shortness of breath.   3. Upper abdominal pressure or discomfort.   4. Lower chest pain, back pain, unusual fatigue, shortness of breath, nausea   Or dizziness.     General Information:   Questions regarding your bill: Call HELP program [phone]     Suicide Hotline ([facility]
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
## 2024-07-30 NOTE — DISCHARGE INSTR - DIET
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
## 2024-07-30 NOTE — TRANSITION OF CARE
Behavioral Health Transition Record    Patient Name: Audrey Bryant  YOB: 1985   Medical Record Number: [id number]  Date of Admission: 7/26/2024  1:01 PM   Date of Discharge: 7/30/2024    Attending Provider: Douglas Schroeder MD   Discharging Provider: Douglas Schroeder  To contact this individual call [phone] and ask the  to page.  If unavailable, ask to be transferred to Behavioral Health Provider on call.  A Behavioral Health Provider will be available on call 24/7 and during holidays.    Primary Care Provider: Kirill Lopes DO    No Known Allergies    Reason for Admission: Pt was admitted due to a drug overdose and required intubation. Pt states she has been feeling depressed and hasn't had her medications. Reported having     Admission Diagnosis: Depression with suicidal ideation [F32.A, R45.851]    * No surgery found *    Results for orders placed or performed during the hospital encounter of 07/26/24   Lithium Level   Result Value Ref Range    Lithium Lvl 0.2 (L) 0.6 - 1.2 mmol/L    Lithium Dose Amount 150     Lithium Date Last Dose 07262024     Lithium Dose Time 2120    Comprehensive Metabolic Panel w/ Reflex to MG   Result Value Ref Range    Sodium 142 135 - 144 mmol/L    Potassium 3.3 (L) 3.7 - 5.3 mmol/L    Chloride 105 98 - 107 mmol/L    CO2 26 20 - 31 mmol/L    Anion Gap 11 9 - 17 mmol/L    Glucose 87 70 - 99 mg/dL    BUN 8 6 - 20 mg/dL    Creatinine 0.6 0.5 - 0.9 mg/dL    Est, Glom Filt Rate >90 >60 mL/min/1.73m2    Calcium 8.6 8.6 - 10.4 mg/dL    Total Protein 6.2 (L) 6.4 - 8.3 g/dL    Albumin 3.2 (L) 3.5 - 5.2 g/dL    Total Bilirubin 0.3 0.3 - 1.2 mg/dL    Alkaline Phosphatase 80 35 - 104 U/L    ALT 7 5 - 33 U/L    AST 11 <32 U/L   Hemoglobin A1C   Result Value Ref Range    Hemoglobin A1C 5.1 4.0 - 6.0 %    Estimated Avg Glucose 100 mg/dL   Lipid Panel   Result Value Ref Range    Cholesterol, Total 150 <200 mg/dL    HDL 38 (L) >40 mg/dL    LDL Cholesterol 90 0 - 130 mg/dL

## 2024-07-30 NOTE — GROUP NOTE
Group Therapy Note    Date: 7/30/2024    Group Start Time: 1300  Group End Time: 1355  Group Topic: Psychoeducation    [facility]    Renata Pearson CTRS    Group Therapy Note    Attendees: 7/17     Patient's Goal:  Patient will identify benefits of mental health education to decrease stigma and increase access to care.      Notes:  Patient attended group and participated    Status After Intervention:  Improved    Participation Level: Active Listener and Interactive    Participation Quality: Appropriate, Attentive, Sharing, and Supportive      Speech:  normal      Thought Process/Content: Logical  Linear      Affective Functioning: Constricted/Restricted      Mood: euthymic      Level of consciousness:  Alert, Oriented x4, and Attentive      Response to Learning: Able to verbalize current knowledge/experience, Able to verbalize/acknowledge new learning, Able to retain information, Able to change behavior, and Progressing to goal      Endings: None Reported    Modes of Intervention: Education, Support, Socialization, and Exploration      Discipline Responsible: Psychoeducational Specialist      Signature:  MAGALIE Perez

## 2024-07-30 NOTE — BH NOTE
Patient given tobacco quitline number 33170118943 at this time, refusing to call at this time, states \" I just dont want to quit now\"- patient given information as to the dangers of long term tobacco use. Continue to reinforce the importance of tobacco cessation.

## 2024-07-30 NOTE — BH NOTE
[facility]  Discharge Note    Pt discharged with followings belongings:   Dental Appliances: None  Vision - Corrective Lenses: None  Hearing Aid: None  Jewelry: None  Body Piercings Removed: N/A  Clothing: Pants, Shirt, Undergarments, Sweater  Other Valuables: Other (Comment) (none)   Valuables sent home with Pt or returned to patient. Patient educated on aftercare instructions: yes  Information faxed to [facility] by RN  at 4:09 PM .Patient verbalize understanding of AVS:  yes.    Status EXAM upon discharge:  Mental Status and Behavioral Exam  Normal: No  Level of Assistance: Independent/Self  Facial Expression: Brightened, Worried (worried about medication adjustments)  Affect: Appropriate  Level of Consciousness: Alert  Frequency of Checks: 4 times per hour, close  Mood:Normal: No  Mood: Anxious  Motor Activity:Normal: Yes  Eye Contact: Good  Observed Behavior: Cooperative, Friendly  Sexual Misconduct History: Current - no  Preception: Ward to person, Ward to time, Ward to place, Ward to situation  Attention:Normal: Yes  Attention: Distractible  Thought Processes: Unremarkable  Thought Content:Normal: Yes  Thought Content: Poverty of content  Depression Symptoms: No problems reported or observed.  Anxiety Symptoms: Generalized  Cynthia Symptoms: No problems reported or observed.  Hallucinations: None  Delusions: No  Memory:Normal: Yes  Insight and Judgment: No  Insight and Judgment: Poor judgment, Poor insight    Tobacco Screening:  Practical Counseling, on admission, chelsi X, if applicable and completed (first 3 are required if patient doesn't refuse):            ( ) Recognizing danger situations (included triggers and roadblocks)                    ( ) Coping skills (new ways to manage stress,relaxation techniques, changing routine, distraction)                                                           ( ) Basic information about quitting (benefits of quitting, techniques in how to quit,

## 2024-07-30 NOTE — GROUP NOTE
Group Therapy Note    Date: 7/30/2024    Group Start Time: 0900  Group End Time: 0915  Group Topic: Community Meeting    [facility] Moises Latif, SAVANA        Group Therapy Note    Attendees: 3/19       Patient's Goal:  talk to provider about medications     Notes:  goals group     Status After Intervention:  Unchanged    Participation Level: Active Listener and Interactive    Participation Quality: Appropriate, Attentive, Sharing, and Supportive      Speech:  normal      Thought Process/Content: Logical  Linear      Affective Functioning: Congruent      Mood: euthymic      Level of consciousness:  Alert, Oriented x4, and Attentive      Response to Learning: Progressing to goal      Endings: None Reported    Modes of Intervention: Education      Discipline Responsible: Registered Nurse      Signature:  Moises Hopkins RN

## 2024-07-30 NOTE — GROUP NOTE
Group Therapy Note    Date: 7/30/2024    Group Start Time: 1000  Group End Time: 1050  Group Topic: Psychoeducation    [facility] Elena Pritchard LSW        Group Therapy Note    Attendees: 7/19       Patient's Goal:  mindful coping     Notes:      Status After Intervention:  Improved    Participation Level: Active Listener and Interactive    Participation Quality: Appropriate      Speech:  normal      Thought Process/Content: Logical      Affective Functioning: Congruent      Mood: euthymic      Level of consciousness:  Alert and Oriented x4      Response to Learning: Able to verbalize current knowledge/experience and Able to verbalize/acknowledge new learning      Endings: None Reported    Modes of Intervention: Education and Support      Discipline Responsible: /Counselor      Signature:  AUDRA Quinonez
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
